# Patient Record
Sex: MALE | Race: WHITE | NOT HISPANIC OR LATINO
[De-identification: names, ages, dates, MRNs, and addresses within clinical notes are randomized per-mention and may not be internally consistent; named-entity substitution may affect disease eponyms.]

---

## 2022-04-07 ENCOUNTER — NON-APPOINTMENT (OUTPATIENT)
Age: 32
End: 2022-04-07

## 2022-04-08 ENCOUNTER — APPOINTMENT (OUTPATIENT)
Dept: NEUROLOGY | Facility: CLINIC | Age: 32
End: 2022-04-08
Payer: COMMERCIAL

## 2022-04-08 VITALS
WEIGHT: 165.34 LBS | DIASTOLIC BLOOD PRESSURE: 80 MMHG | HEIGHT: 70 IN | HEART RATE: 62 BPM | TEMPERATURE: 98.2 F | OXYGEN SATURATION: 98 % | BODY MASS INDEX: 23.67 KG/M2 | SYSTOLIC BLOOD PRESSURE: 123 MMHG

## 2022-04-08 DIAGNOSIS — Z78.9 OTHER SPECIFIED HEALTH STATUS: ICD-10-CM

## 2022-04-08 DIAGNOSIS — Z82.0 FAMILY HISTORY OF EPILEPSY AND OTHER DISEASES OF THE NERVOUS SYSTEM: ICD-10-CM

## 2022-04-08 DIAGNOSIS — Z00.00 ENCOUNTER FOR GENERAL ADULT MEDICAL EXAMINATION W/OUT ABNORMAL FINDINGS: ICD-10-CM

## 2022-04-08 DIAGNOSIS — E55.9 VITAMIN D DEFICIENCY, UNSPECIFIED: ICD-10-CM

## 2022-04-08 DIAGNOSIS — H53.9 UNSPECIFIED VISUAL DISTURBANCE: ICD-10-CM

## 2022-04-08 PROCEDURE — 99215 OFFICE O/P EST HI 40 MIN: CPT

## 2022-04-08 NOTE — HISTORY OF PRESENT ILLNESS
[FreeTextEntry1] : History obtained from patient and cousin.\par \par Tavo Cope is a 31 year old man with a history of left shoulder osteomyelitis, COVID-19 twice (June 2021) presenting for a hospital follow up appointment. \par \par Mr. Cope endorses over one week prior to his hospitalization on 4/1/22 he starting having progressive and persistent numbness throughout his face and limbs that came on suddenly when he woke up from sleep and was worse with walking. He was previously working out at the gym and weight lifting prior to this episode and the symptoms are now improved with rest. He only has a tingling sensation throughout his anterior abdominal area. Prior to his ED visit he reports a generalized burning sensation in his lower extremities and throughout his back with altered sensation to cold. This has now subsided. He also reports blurred vision that has also improved with generalized right eye "fatigue." Denies ptosis or neck weakness. This does not fluctuate throughout the day. He has not seen ophthalmology . He also reports numbness in his right foot and hand that has resolved.\par \par He endorses joint pain in his right hip for months described as throbbing and went to ortho and did a course of PT. Denies back pain radiating down the leg.  Denies bowel or bladder difficulties or issues.  \par  \par He endorses episodes of "brain fog." He describes dizziness lasting a few minutes or longer and can last 30 minutes. Feels that the room is spinning at times and could be triggered by movement. Denies any hearing loss, tinnitus, ear infections, nausea or vomiting.  Not change in memory and reports he is able to perform his ADLs without difficulty. \par  \par He had one episode noted by his Aunt of confusion and mispronouncing words. This has not reoccurred since discharge from the hospital. \par Feels his legs are mildly fatigued while walking up the stairs. Denies falls or changes in balance. Denies weakness on one side. Denies headaches. \par \par The remaining neurological review of systems is negative. \par \par 4/1/22 HPI Dr. Davis [facility] \par "31 year old male presents in ER with 2 week history of progressive and persistent numbness involving face and his limbs\par \par Patient grew up in [place] and migrated to [place] 2 years prior\par He lives in [place] but has an aunt in [place]\par \par The patient recalls noting right hip pain 6 months prior\par 2 months prior he had left shoulder pain\par In the past 2 weeks he has noted pain in the occipital region and tingling/numb sensation involvign his face, arms and legs\par He denies weakness , balance issues , bowel/bladder issues\par \par He feels his vision is blurry as well at times \par He called his aunt yesterday and she was concerned because he seemed confused and he mispronounced a word \par \par His mother was diagnosed with Parkinson disease and there is a family history of multiple sclerosis \par \par Covid vaccine administered last in June 2021\par No fevers, chill, injuries, limb weakness, incoordination, bowel change, rashes , tick/bug bites."

## 2022-04-08 NOTE — REASON FOR VISIT
[Consultation] : a consultation visit [FreeTextEntry1] : ER follow up numbness tingling pins and needle brain fog

## 2022-04-08 NOTE — ASSESSMENT
[FreeTextEntry1] : Tavo Cope is a 31 year old man with paresthesias of unknown etiology-improving.\par \par MRI Brain and Cervical spine normal.\par Will check MRI thoracic w/wo to rule out any demyelinating disease.\par Vitamin B12 373. Recommend Vitamin B12 supplement for goal B12>400.\par Additional serological workup for any other identifiable causes for paresthesias.\par \par Previous visual disturbances and right eye fatigue- Ophthalmology referral for baseline eye exam and formal visual field testing. \par Pt has not seen ophthalmology in over two years.\par \par We discussed possible vestibular therapy for dizziness. He declined at this time.\par \par Internal medicine referral for establishment of PCP.\par \par Follow up in 1 months to review results. \par \par

## 2022-04-08 NOTE — REVIEW OF SYSTEMS
[Anxiety] : anxiety [Numbness] : numbness [Tingling] : tingling [Vertigo] : vertigo [Cluster Headache] : cluster headaches [Joint Pain] : joint pain [Joint Stiffness] : joint stiffness [Negative] : Heme/Lymph [Fever] : no fever [Chills] : no chills [Feeling Tired] : not feeling tired [Depression] : no depression [Seizures] : no convulsions [Difficulty Walking] : no difficulty walking [Inability to Walk] : able to walk [Eye Pain] : no eye pain [Red Eyes] : eyes not red [Loss Of Hearing] : no hearing loss [Chest Pain] : no chest pain [Palpitations] : no palpitations [Shortness Of Breath] : no shortness of breath [Wheezing] : no wheezing [Abdominal Pain] : no abdominal pain [Vomiting] : no vomiting [Constipation] : no constipation [Diarrhea] : no diarrhea [Joint Swelling] : no joint swelling [Itching] : no itching [Muscle Weakness] : no muscle weakness [Easy Bleeding] : no tendency for easy bleeding [Easy Bruising] : no tendency for easy bruising [Swollen Glands] : no swollen glands [FreeTextEntry3] : blurry vision

## 2022-04-08 NOTE — PHYSICAL EXAM
[FreeTextEntry1] : Physical examination \par General: No acute distress, Awake, Alert.   \par BP supine 105/78\par BP standing after 1 minute: 126/81\par BP standing after 3 minutes: 112/72\par \par Fundus: disc margins sharp.   \par \par Mental status \par Awake, alert, and oriented to person, time and place, Normal attention span and concentration, Recent and remote memory intact, Language intact, Fund of knowledge intact.   \par Delayed recall 3/3\par Able to spell "WORLD" backwards\par Able to do serial 7 calculations.\par \par Cranial Nerves \par II: VFF  \par III, IV, VI: PERRL, EOMI.   \par V: Facial sensation is normal B/L.   \par VII: Facial strength is normal B/L. \par \par \par VIII: Gross hearing is intact.   \par  \par \par IX, X: Palate is midline and elevates symmetrically.   \par XI: Trapezius normal strength.   \par XII: Tongue midline without atrophy or fasciculations. \par \par Motor exam  \par Muscle tone - no evidence of rigidity or resistance in all 4 extremities.  \par No atrophy or fasciculations \par Muscle Strength: arms and legs, proximal and distal flexors and extensors are normal \par \par No UE drift.\par \par Reflexes \par Arms 2\par Legs 3\par \par Plantars right: mute.   \par Plantars left: mute.   \par  \par \par Coordination \par Finger to nose: Normal.  \par Heel to shin: Normal.   \par  \par \par Sensory \par Intact sensation to vibration, PP, and cold including trunk. \par  Intact proprioception. \par \par \par Gait \par Normal including heels, toes, and tandem gait.  \par  \par \par

## 2022-04-08 NOTE — DATA REVIEWED
[de-identified] : 4/1/22 \par Impression: Normal MRI of the brain No evidence of acute infarction.\par  [de-identified] : 4/1/22 CT head\par IMPRESSION: No acute abnormality.\par 4/1/22 MRI cervical spine\par Impression: Normal MRI of the cervical spine. No evidence of abnormal signal changes within the spinal cord. No evidence of abnormal enhancement. No evidence of spinal cord compression.\par \par 4/1/22\par Lyme negative\par Copper 99\par Vitamin B12 373\par Hepatitis B and C negative\par

## 2022-04-26 ENCOUNTER — NON-APPOINTMENT (OUTPATIENT)
Age: 32
End: 2022-04-26

## 2022-04-26 ENCOUNTER — TRANSCRIPTION ENCOUNTER (OUTPATIENT)
Age: 32
End: 2022-04-26

## 2022-04-27 ENCOUNTER — LABORATORY RESULT (OUTPATIENT)
Age: 32
End: 2022-04-27

## 2022-04-28 LAB
CRP SERPL-MCNC: <3 MG/L
ERYTHROCYTE [SEDIMENTATION RATE] IN BLOOD BY WESTERGREN METHOD: < 2 MM/HR
ESTIMATED AVERAGE GLUCOSE: 114 MG/DL
FERRITIN SERPL-MCNC: 239 NG/ML
HBA1C MFR BLD HPLC: 5.6 %

## 2022-04-29 LAB
25(OH)D3 SERPL-MCNC: 20.7 NG/ML
CK SERPL-CCNC: 111 U/L
TSH SERPL-ACNC: 5.26 UIU/ML

## 2022-04-29 RX ORDER — ERGOCALCIFEROL 1.25 MG/1
1.25 MG CAPSULE, LIQUID FILLED ORAL
Qty: 6 | Refills: 0 | Status: ACTIVE | COMMUNITY
Start: 2022-04-29 | End: 1900-01-01

## 2022-05-02 ENCOUNTER — LABORATORY RESULT (OUTPATIENT)
Age: 32
End: 2022-05-02

## 2022-05-02 LAB
ALBUMIN MFR SERPL ELPH: 63.8 %
ALBUMIN SERPL-MCNC: 4.5 G/DL
ALBUMIN/GLOB SERPL: 1.8 RATIO
ALBUPE: 16.1 %
ALPHA1 GLOB MFR SERPL ELPH: 3.3 %
ALPHA1 GLOB SERPL ELPH-MCNC: 0.2 G/DL
ALPHA1UPE: 35.2 %
ALPHA2 GLOB MFR SERPL ELPH: 9.5 %
ALPHA2 GLOB SERPL ELPH-MCNC: 0.7 G/DL
ALPHA2UPE: 25.8 %
ANA SER IF-ACNC: NEGATIVE
B-GLOBULIN MFR SERPL ELPH: 10.9 %
B-GLOBULIN SERPL ELPH-MCNC: 0.8 G/DL
BETAUPE: 16.8 %
CREAT 24H UR-MCNC: NORMAL G/24 H
CREATININE UR (MAYO): 335 MG/DL
GAMMA GLOB FLD ELPH-MCNC: 0.9 G/DL
GAMMA GLOB MFR SERPL ELPH: 12.5 %
GAMMAUPE: 6.1 %
IGA 24H UR QL IFE: NORMAL
INTERPRETATION SERPL IEP-IMP: NORMAL
KAPPA LC 24H UR QL: NORMAL
M PROTEIN SPEC IFE-MCNC: NORMAL
PROT PATTERN 24H UR ELPH-IMP: NORMAL
PROT SERPL-MCNC: 7 G/DL
PROT SERPL-MCNC: 7 G/DL
PROT UR-MCNC: 15 MG/DL
PROT UR-MCNC: 15 MG/DL
SPECIMEN VOL 24H UR: NORMAL ML

## 2022-05-03 LAB
BUN SERPL-MCNC: 15 MG/DL
CREAT SERPL-MCNC: 1.27 MG/DL
EGFR: 78 ML/MIN/1.73M2

## 2022-05-06 ENCOUNTER — RESULT REVIEW (OUTPATIENT)
Age: 32
End: 2022-05-06

## 2022-05-09 LAB — VIT B6 SERPL-MCNC: 30.8 UG/L

## 2022-05-10 ENCOUNTER — APPOINTMENT (OUTPATIENT)
Dept: NEUROLOGY | Facility: CLINIC | Age: 32
End: 2022-05-10
Payer: COMMERCIAL

## 2022-05-10 VITALS
SYSTOLIC BLOOD PRESSURE: 129 MMHG | DIASTOLIC BLOOD PRESSURE: 76 MMHG | TEMPERATURE: 98.2 F | WEIGHT: 167.55 LBS | HEART RATE: 80 BPM | BODY MASS INDEX: 23.99 KG/M2 | OXYGEN SATURATION: 99 % | HEIGHT: 70 IN

## 2022-05-10 VITALS
BODY MASS INDEX: 23.91 KG/M2 | SYSTOLIC BLOOD PRESSURE: 129 MMHG | OXYGEN SATURATION: 99 % | HEIGHT: 70 IN | WEIGHT: 167 LBS | HEART RATE: 80 BPM | TEMPERATURE: 98.2 F | DIASTOLIC BLOOD PRESSURE: 76 MMHG

## 2022-05-10 DIAGNOSIS — R20.2 PARESTHESIA OF SKIN: ICD-10-CM

## 2022-05-10 LAB — VIT B1 SERPL-MCNC: 121.6 NMOL/L

## 2022-05-10 PROCEDURE — 99214 OFFICE O/P EST MOD 30 MIN: CPT

## 2022-05-10 RX ORDER — AMITRIPTYLINE HYDROCHLORIDE 10 MG/1
10 TABLET, FILM COATED ORAL
Qty: 30 | Refills: 4 | Status: ACTIVE | COMMUNITY
Start: 2022-05-10 | End: 1900-01-01

## 2022-05-10 RX ORDER — FLUTICASONE PROPIONATE 50 MCG
SPRAY, SUSPENSION NASAL
Refills: 0 | Status: ACTIVE | COMMUNITY

## 2022-05-10 RX ORDER — PNV NO.95/FERROUS FUM/FOLIC AC 28MG-0.8MG
TABLET ORAL
Refills: 0 | Status: ACTIVE | COMMUNITY

## 2022-05-10 NOTE — PHYSICAL EXAM
[FreeTextEntry1] : Physical examination \par General: No acute distress, Awake, Alert.   \par  \par Mental status \par Awake, alert, and oriented to person, time and place, Normal attention span and concentration, Recent and remote memory intact, Language intact, Fund of knowledge intact.   \par Delayed recall 3/3 (previous visit)\par Able to spell "WORLD" backwards (previous visit)\par Able to do serial 7 calculations. (previous visit)\par \par Cranial Nerves \par II: VFF  \par III, IV, VI: PERRL, EOMI.   \par V: Facial sensation is normal B/L.   \par VII: Facial strength is normal B/L. \par \par \par VIII: Gross hearing is intact.   \par  \par \par IX, X: Palate is midline and elevates symmetrically.   \par XI: Trapezius normal strength.   \par XII: Tongue midline without atrophy or fasciculations. \par \par Motor exam  \par Muscle tone - no evidence of rigidity or resistance in all 4 extremities.  \par No atrophy or fasciculations \par Muscle Strength: arms and legs, proximal and distal flexors and extensors are normal \par \par No UE drift.\par \par Reflexes \par Arms 2\par Legs 3\par \par Plantars right: mute.   \par Plantars left: mute.   \par  \par \par Coordination \par Finger to nose: Normal.  \par Heel to shin: Normal.   \par  \par \par Sensory \par Intact sensation to vibration, PP, and cold including trunk. \par  Intact proprioception. \par \par \par Gait \par Normal including heels, toes, and tandem gait.  \par  \par \par

## 2022-05-10 NOTE — DATA REVIEWED
[de-identified] : 4/1/22 \par Impression: Normal MRI of the brain No evidence of acute infarction.\par  [de-identified] : 4/1/22 CT head\par IMPRESSION: No acute abnormality.\par 4/1/22 MRI cervical spine\par Impression: Normal MRI of the cervical spine. No evidence of abnormal signal changes within the spinal cord. No evidence of abnormal enhancement. No evidence of spinal cord compression.\par \par 4/1/22\par Lyme negative\par Copper 99\par Vitamin B12 373\par Hepatitis B and C negative\par \par 5/6/22\par MRI thoracic spine\par  IMPRESSION:\par \par \par  No significant canal or foraminal stenosis of the thoracic spine.\par  No abnormal cord signal or enhancement identified within the thoracic spine.\par \par

## 2022-05-10 NOTE — ASSESSMENT
[FreeTextEntry1] : Tavo Cope is a 31 year old man with paresthesias of unknown etiology-improving.\par Abnormal thyroid may be contributing.\par \par MRI Brain, Cervical and thoracic spine normal. No need for repeat MRIs at this point. \par Vitamin B12 373. Recommend Vitamin B12 supplement for goal B12>400. \par \par Vitamin D deficiency- Continue Vitamin D supplements once per week for 6 weeks and further supplementation recommendations as per PCP. \par \par Previous visual disturbances and right eye fatigue- Ophthalmology referral for baseline eye exam and formal visual field testing. \par Pt has not seen ophthalmology in over two years. Encouraged him to make an appointment again today. \par \par Internal medicine referral for establishment of PCP.\par \par Normal sensory exam with episodes of stabbing pain to his hands and feet- Amitriptyline 10mg at bedtime to help target pain, anxiety, and sleep.\par EMG arms and legs.\par \par Abnormal TSH and proteinuria- follow up with PCP and endocrinology referral.\par Will add additional autoimmune thyroid labs. \par \par Case and management discussed with Dr. Davis in agreement with above.\par \par Follow up in July as scheduled with Dr. Davis. \par \par

## 2022-05-10 NOTE — CONSULT LETTER
[Dear  ___] : Dear  [unfilled], [Consult Letter:] : I had the pleasure of evaluating your patient, [unfilled]. [Please see my note below.] : Please see my note below. [Consult Closing:] : Thank you very much for allowing me to participate in the care of this patient.  If you have any questions, please do not hesitate to contact me. [Sincerely,] : Sincerely, [FreeTextEntry3] : Gi Mancia NPrashantP.\par

## 2022-05-10 NOTE — HISTORY OF PRESENT ILLNESS
[FreeTextEntry1] : History obtained from patient.\par \par Tavo Cope is a 31 year old man with a history of left shoulder osteomyelitis, COVID-19 twice (June 2021), psoriasis (transient as per patient), presenting for a follow up appointment. \par \par He reports he felt fine until April 18th and then noted tingling in his hands and feet that was intermittent for three days. He went dancing on the April 23rd and had tingling, pins and needle sensation in his hands, feet, and face with no weakness that resolved after April 30th. His initial symptoms started after working out. \par \par Today, he reports minimal tingling in his hands and forearm. Denies neck pain radiating down the arm. Denies weakness. \fransisco Has PCP appointment scheduled tomorrow for proteinuria. \fransisco tavarez Has chronic right intragluteal and right hip joint pain and is following with an orthopedic doctor. Did PT in the past. Denies back pain radiating down the leg. \par \par No longer having lightheadedness or dizziness.\par \par The remaining neurological review of systems is negative.\par \par 4/8/22\par History obtained from patient and cousin.\par \par Tavo Cope is a 31 year old man with a history of left shoulder osteomyelitis, COVID-19 twice (June 2021) presenting for a hospital follow up appointment. \par \par Mr. Cope endorses over one week prior to his hospitalization on 4/1/22 he starting having progressive and persistent numbness throughout his face and limbs that came on suddenly when he woke up from sleep and was worse with walking. He was previously working out at the gym and weight lifting prior to this episode and the symptoms are now improved with rest. He only has a tingling sensation throughout his anterior abdominal area. Prior to his ED visit he reports a generalized burning sensation in his lower extremities and throughout his back with altered sensation to cold. This has now subsided. He also reports blurred vision that has also improved with generalized right eye "fatigue." Denies ptosis or neck weakness. This does not fluctuate throughout the day. He has not seen ophthalmology . He also reports numbness in his right foot and hand that has resolved.\par \par He endorses joint pain in his right hip for months described as throbbing and went to ortho and did a course of PT. Denies back pain radiating down the leg.  Denies bowel or bladder difficulties or issues.  \par  \par He endorses episodes of "brain fog." He describes dizziness lasting a few minutes or longer and can last 30 minutes. Feels that the room is spinning at times and could be triggered by movement. Denies any hearing loss, tinnitus, ear infections, nausea or vomiting.  Not change in memory and reports he is able to perform his ADLs without difficulty. \par  \par He had one episode noted by his Aunt of confusion and mispronouncing words. This has not reoccurred since discharge from the hospital. \par Feels his legs are mildly fatigued while walking up the stairs. Denies falls or changes in balance. Denies weakness on one side. Denies headaches. \par \par The remaining neurological review of systems is negative. \par \par 4/1/22 [facility] Dr. Davis [facility] \par "31 year old male presents in ER with 2 week history of progressive and persistent numbness involving face and his limbs\par \par Patient grew up in [place] and [place] 2 [place] prior\par He lives in [place] but has an aunt in [place]\par \par The patient recalls noting right hip pain 6 months prior\par 2 months prior he had left shoulder pain\par In the past 2 weeks he has noted pain in the occipital region and tingling/numb sensation involvign his face, arms and legs\par He denies weakness , balance issues , bowel/bladder issues\par \par He feels his vision is blurry as well at times \par He called his aunt yesterday and she was concerned because he seemed confused and he mispronounced a word \par \par His mother was diagnosed with Parkinson disease and there is a family history of multiple sclerosis \par \par Covid vaccine administered last in June 2021\par No fevers, chill, injuries, limb weakness, incoordination, bowel change, rashes , tick/bug bites."

## 2022-05-10 NOTE — DATA REVIEWED
[de-identified] : 4/1/22 \par Impression: Normal MRI of the brain No evidence of acute infarction.\par  [de-identified] : 4/1/22 CT head\par IMPRESSION: No acute abnormality.\par 4/1/22 MRI cervical spine\par Impression: Normal MRI of the cervical spine. No evidence of abnormal signal changes within the spinal cord. No evidence of abnormal enhancement. No evidence of spinal cord compression.\par \par 4/1/22\par Lyme negative\par Copper 99\par Vitamin B12 373\par Hepatitis B and C negative\par \par 5/6/22\par MRI thoracic spine\par  IMPRESSION:\par \par \par  No significant canal or foraminal stenosis of the thoracic spine.\par  No abnormal cord signal or enhancement identified within the thoracic spine.\par \par

## 2022-05-10 NOTE — HISTORY OF PRESENT ILLNESS
[FreeTextEntry1] : History obtained from patient.\par \par Tavo Cope is a 31 year old man with a history of left shoulder osteomyelitis, COVID-19 twice (June 2021), psoriasis (transient as per patient), presenting for a follow up appointment. \par \par He reports he felt fine until April 18th and then noted tingling in his hands and feet that was intermittent for three days. He went dancing on the April 23rd and had tingling, pins and needle sensation in his hands, feet, and face with no weakness that resolved after April 30th. His initial symptoms started after working out. \par \par Today, he reports minimal tingling in his hands and forearm. Denies neck pain radiating down the arm. Denies weakness. \fransisco Has PCP appointment scheduled tomorrow for proteinuria. \fransisco tavarez Has chronic right intragluteal and right hip joint pain and is following with an orthopedic doctor. Did PT in the past. Denies back pain radiating down the leg. \par \par No longer having lightheadedness or dizziness.\par \par The remaining neurological review of systems is negative.\par \par 4/8/22\par History obtained from patient and cousin.\par \par Tavo Cope is a 31 year old man with a history of left shoulder osteomyelitis, COVID-19 twice (June 2021) presenting for a hospital follow up appointment. \par \par Mr. Cope endorses over one week prior to his hospitalization on 4/1/22 he starting having progressive and persistent numbness throughout his face and limbs that came on suddenly when he woke up from sleep and was worse with walking. He was previously working out at the gym and weight lifting prior to this episode and the symptoms are now improved with rest. He only has a tingling sensation throughout his anterior abdominal area. Prior to his ED visit he reports a generalized burning sensation in his lower extremities and throughout his back with altered sensation to cold. This has now subsided. He also reports blurred vision that has also improved with generalized right eye "fatigue." Denies ptosis or neck weakness. This does not fluctuate throughout the day. He has not seen ophthalmology . He also reports numbness in his right foot and hand that has resolved.\par \par He endorses joint pain in his right hip for months described as throbbing and went to ortho and did a course of PT. Denies back pain radiating down the leg.  Denies bowel or bladder difficulties or issues.  \par  \par He endorses episodes of "brain fog." He describes dizziness lasting a few minutes or longer and can last 30 minutes. Feels that the room is spinning at times and could be triggered by movement. Denies any hearing loss, tinnitus, ear infections, nausea or vomiting.  Not change in memory and reports he is able to perform his ADLs without difficulty. \par  \par He had one episode noted by his Aunt of confusion and mispronouncing words. This has not reoccurred since discharge from the hospital. \par Feels his legs are mildly fatigued while walking up the stairs. Denies falls or changes in balance. Denies weakness on one side. Denies headaches. \par \par The remaining neurological review of systems is negative. \par \par 4/1/22 [facility] [place] \par "31 year old male presents in ER with 2 week history of progressive and persistent numbness involving face and his limbs\par \par Patient grew up in [place] and [place] 2 [place] prior\par He lives in [place] but has an aunt in [place]\par \par The patient recalls noting right hip pain 6 months prior\par 2 months prior he had left shoulder pain\par In the past 2 weeks he has noted pain in the occipital region and tingling/numb sensation involvign his face, arms and legs\par He denies weakness , balance issues , bowel/bladder issues\par \par He feels his vision is blurry as well at times \par He called his aunt yesterday and she was concerned because he seemed confused and he mispronounced a word \par \par His mother was diagnosed with Parkinson disease and there is a family history of multiple sclerosis \par \par Covid vaccine administered last in June 2021\par No fevers, chill, injuries, limb weakness, incoordination, bowel change, rashes , tick/bug bites."

## 2022-05-12 ENCOUNTER — LABORATORY RESULT (OUTPATIENT)
Age: 32
End: 2022-05-12

## 2022-05-13 ENCOUNTER — TRANSCRIPTION ENCOUNTER (OUTPATIENT)
Age: 32
End: 2022-05-13

## 2022-05-13 LAB
CALCIUM SERPL-MCNC: 10 MG/DL
CALCIUM SERPL-MCNC: 10 MG/DL
PARATHYROID HORMONE INTACT: 29 PG/ML
PARATHYROID HORMONE INTACT: 30 PG/ML
T3 SERPL-MCNC: 99 NG/DL
T3RU NFR SERPL: 1 TBI
T4 SERPL-MCNC: 6.5 UG/DL
THYROGLOB AB SERPL-ACNC: <20 IU/ML
THYROPEROXIDASE AB SERPL IA-ACNC: <10 IU/ML

## 2022-05-16 ENCOUNTER — TRANSCRIPTION ENCOUNTER (OUTPATIENT)
Age: 32
End: 2022-05-16

## 2022-07-18 ENCOUNTER — APPOINTMENT (OUTPATIENT)
Dept: NEUROLOGY | Facility: CLINIC | Age: 32
End: 2022-07-18

## 2022-07-26 ENCOUNTER — APPOINTMENT (OUTPATIENT)
Dept: NEUROLOGY | Facility: CLINIC | Age: 32
End: 2022-07-26
